# Patient Record
Sex: MALE | Race: WHITE | NOT HISPANIC OR LATINO
[De-identification: names, ages, dates, MRNs, and addresses within clinical notes are randomized per-mention and may not be internally consistent; named-entity substitution may affect disease eponyms.]

---

## 2017-03-18 ENCOUNTER — APPOINTMENT (OUTPATIENT)
Dept: ORTHOPEDIC SURGERY | Facility: CLINIC | Age: 62
End: 2017-03-18

## 2017-03-18 VITALS — WEIGHT: 315 LBS | TEMPERATURE: 97.9 F | HEIGHT: 70 IN | BODY MASS INDEX: 45.1 KG/M2

## 2017-03-18 DIAGNOSIS — M47.816 SPONDYLOSIS W/OUT MYELOPATHY OR RADICULOPATHY, LUMBAR REGION: ICD-10-CM

## 2017-03-18 DIAGNOSIS — Z78.9 OTHER SPECIFIED HEALTH STATUS: ICD-10-CM

## 2017-03-18 DIAGNOSIS — Z86.79 PERSONAL HISTORY OF OTHER DISEASES OF THE CIRCULATORY SYSTEM: ICD-10-CM

## 2017-03-18 DIAGNOSIS — Z82.61 FAMILY HISTORY OF ARTHRITIS: ICD-10-CM

## 2017-03-18 DIAGNOSIS — E78.00 PURE HYPERCHOLESTEROLEMIA, UNSPECIFIED: ICD-10-CM

## 2017-03-18 DIAGNOSIS — Z87.891 PERSONAL HISTORY OF NICOTINE DEPENDENCE: ICD-10-CM

## 2017-03-18 DIAGNOSIS — M48.06 SPINAL STENOSIS, LUMBAR REGION: ICD-10-CM

## 2017-03-18 DIAGNOSIS — Z87.39 PERSONAL HISTORY OF OTHER DISEASES OF THE MUSCULOSKELETAL SYSTEM AND CONNECTIVE TISSUE: ICD-10-CM

## 2017-03-18 RX ORDER — FOLIC ACID 1 MG/1
1 TABLET ORAL
Qty: 90 | Refills: 0 | Status: ACTIVE | COMMUNITY
Start: 2016-12-01

## 2017-03-18 RX ORDER — FENOFIBRATE 134 MG/1
134 CAPSULE ORAL
Qty: 90 | Refills: 0 | Status: ACTIVE | COMMUNITY
Start: 2017-03-07

## 2017-03-18 RX ORDER — CEFADROXIL 500 MG/1
500 CAPSULE ORAL
Qty: 14 | Refills: 0 | Status: ACTIVE | COMMUNITY
Start: 2016-12-06

## 2017-03-18 RX ORDER — CLOPIDOGREL BISULFATE 75 MG/1
75 TABLET, FILM COATED ORAL
Qty: 90 | Refills: 0 | Status: ACTIVE | COMMUNITY
Start: 2015-11-05

## 2017-03-18 RX ORDER — HYDROCHLOROTHIAZIDE 12.5 MG/1
12.5 CAPSULE ORAL
Qty: 45 | Refills: 0 | Status: ACTIVE | COMMUNITY
Start: 2017-03-02

## 2017-03-18 RX ORDER — MAGNESIUM OXIDE 400 MG
400 (241.3 MG) TABLET ORAL
Qty: 60 | Refills: 0 | Status: ACTIVE | COMMUNITY
Start: 2017-03-03

## 2017-03-18 RX ORDER — MOMETASONE 50 UG/1
50 SPRAY, METERED NASAL
Qty: 17 | Refills: 0 | Status: ACTIVE | COMMUNITY
Start: 2017-01-05

## 2017-03-18 RX ORDER — PANTOPRAZOLE 40 MG/1
40 TABLET, DELAYED RELEASE ORAL
Qty: 90 | Refills: 0 | Status: ACTIVE | COMMUNITY
Start: 2017-01-07

## 2017-03-18 RX ORDER — ATENOLOL 50 MG/1
50 TABLET ORAL
Qty: 90 | Refills: 0 | Status: ACTIVE | COMMUNITY
Start: 2016-09-21

## 2017-03-18 RX ORDER — CEFUROXIME AXETIL 250 MG/1
250 TABLET ORAL
Qty: 20 | Refills: 0 | Status: ACTIVE | COMMUNITY
Start: 2017-01-05

## 2017-03-18 RX ORDER — ROSUVASTATIN CALCIUM 40 MG/1
40 TABLET, FILM COATED ORAL
Qty: 90 | Refills: 0 | Status: ACTIVE | COMMUNITY
Start: 2017-03-02

## 2017-03-18 RX ORDER — NAPROXEN 500 MG/1
500 TABLET ORAL
Qty: 60 | Refills: 0 | Status: ACTIVE | COMMUNITY
Start: 2016-07-21

## 2017-03-18 RX ORDER — SILDENAFIL CITRATE 50 MG/1
50 TABLET, FILM COATED ORAL
Qty: 5 | Refills: 0 | Status: ACTIVE | COMMUNITY
Start: 2017-01-13

## 2017-03-18 RX ORDER — AMLODIPINE BESYLATE AND BENAZEPRIL HYDROCHLORIDE 5; 20 MG/1; MG/1
5-20 CAPSULE ORAL
Qty: 90 | Refills: 0 | Status: ACTIVE | COMMUNITY
Start: 2017-01-06

## 2017-03-24 ENCOUNTER — OUTPATIENT (OUTPATIENT)
Dept: OUTPATIENT SERVICES | Facility: HOSPITAL | Age: 62
LOS: 1 days | End: 2017-03-24
Payer: COMMERCIAL

## 2017-03-24 DIAGNOSIS — Z51.89 ENCOUNTER FOR OTHER SPECIFIED AFTERCARE: ICD-10-CM

## 2017-03-24 DIAGNOSIS — M48.06 SPINAL STENOSIS, LUMBAR REGION: ICD-10-CM

## 2017-04-13 PROCEDURE — 97163 PT EVAL HIGH COMPLEX 45 MIN: CPT

## 2017-04-13 PROCEDURE — 97110 THERAPEUTIC EXERCISES: CPT

## 2017-04-13 PROCEDURE — 97010 HOT OR COLD PACKS THERAPY: CPT

## 2017-05-17 RX ORDER — TRAMADOL HYDROCHLORIDE 50 MG/1
50 TABLET, COATED ORAL
Qty: 60 | Refills: 0 | Status: ACTIVE | COMMUNITY
Start: 2017-03-18 | End: 1900-01-01

## 2017-10-11 ENCOUNTER — EMERGENCY (EMERGENCY)
Facility: HOSPITAL | Age: 62
LOS: 1 days | Discharge: HOME | End: 2017-10-11
Attending: EMERGENCY MEDICINE
Payer: MEDICARE

## 2017-10-11 VITALS
DIASTOLIC BLOOD PRESSURE: 74 MMHG | WEIGHT: 257.94 LBS | SYSTOLIC BLOOD PRESSURE: 129 MMHG | HEIGHT: 70 IN | OXYGEN SATURATION: 97 % | HEART RATE: 63 BPM | TEMPERATURE: 98 F | RESPIRATION RATE: 18 BRPM

## 2017-10-11 PROCEDURE — 99283 EMERGENCY DEPT VISIT LOW MDM: CPT

## 2017-10-11 PROCEDURE — 73630 X-RAY EXAM OF FOOT: CPT | Mod: 26,RT

## 2017-10-11 PROCEDURE — 73630 X-RAY EXAM OF FOOT: CPT

## 2017-10-11 PROCEDURE — 99283 EMERGENCY DEPT VISIT LOW MDM: CPT | Mod: 25

## 2017-10-11 RX ORDER — OXYCODONE AND ACETAMINOPHEN 5; 325 MG/1; MG/1
1 TABLET ORAL ONCE
Qty: 0 | Refills: 0 | Status: DISCONTINUED | OUTPATIENT
Start: 2017-10-11 | End: 2017-10-11

## 2017-10-11 RX ADMIN — OXYCODONE AND ACETAMINOPHEN 1 TABLET(S): 5; 325 TABLET ORAL at 11:26

## 2017-10-11 NOTE — ED ADULT TRIAGE NOTE - CHIEF COMPLAINT QUOTE
pt states he tripped yesterday and injured his right foot. today right foot is red, swollen and painful

## 2017-10-11 NOTE — ED ADULT NURSE NOTE - PSH
S/P coronary artery bypass graft x 4  2013 at [facility]  S/P femoral-popliteal bypass surgery

## 2017-10-11 NOTE — ED PROVIDER NOTE - PSH
S/P coronary artery bypass graft x 4  2013 at [facility]  S/P femoral-popliteal bypass surgery

## 2017-10-11 NOTE — ED PROVIDER NOTE - ATTENDING CONTRIBUTION TO CARE
Tripped twice yesterday: once walking up stiars and second time obver a pile of rubbish.  Reports pain and swelling of right foot since; worse this morning with difficulty bearing wt.  denies injury to ankle, knee or hip.  Has not taken any rx.  PE:  nontoxic appearing, NWAD, STS of right foot with warmth and erythema to lateral aspect of right foot over 4-5th MTs.  no ankle swelling or ecchymsois, no malleolus tendenress,  FROM knee.

## 2017-10-11 NOTE — ED PROVIDER NOTE - PHYSICAL EXAMINATION
Right foot: + tenderness to proximal 5th metatarsal and midfoot with mild swelling and mild overlying erythema, NVI.

## 2017-10-11 NOTE — ED PROVIDER NOTE - PMH
AAA (abdominal aortic aneurysm) without rupture    Bacteremia    BPH (benign prostatic hyperplasia)    Cellulitis  LLE  COPD (chronic obstructive pulmonary disease)    Cutaneous T-cell lymphoma    DM (diabetes mellitus)    GERD (gastroesophageal reflux disease)    HTN (hypertension)    Stented coronary artery  x 4  Thalassemia trait

## 2017-10-24 ENCOUNTER — APPOINTMENT (OUTPATIENT)
Dept: INTERNAL MEDICINE | Facility: CLINIC | Age: 62
End: 2017-10-24

## 2018-01-21 ENCOUNTER — TRANSCRIPTION ENCOUNTER (OUTPATIENT)
Age: 63
End: 2018-01-21

## 2019-03-24 ENCOUNTER — TRANSCRIPTION ENCOUNTER (OUTPATIENT)
Age: 64
End: 2019-03-24

## 2020-05-13 ENCOUNTER — TRANSCRIPTION ENCOUNTER (OUTPATIENT)
Age: 65
End: 2020-05-13

## 2023-02-08 NOTE — ED ADULT NURSE NOTE - PAIN: PRESENCE, MLM
SCRIBE #1 NOTE: I, Jayshree Bedoya, am scribing for, and in the presence of, Claudio Guillen Jr., MD. I have scribed the entire note.       History     Chief Complaint   Patient presents with    Animal Bite     Pt broke up a dog fight this morning and has 3 bite wounds to R. Wrist. Pt states dogs are owned by him and are vaccinated      Review of patient's allergies indicates:  No Known Allergies      History of Present Illness     HPI    2/8/2023, 8:53 AM  History obtained from the patient      History of Present Illness: Shivam Jane is a 34 y.o. male patient who presents to the Emergency Department for evaluation of an animal bite which occurred this morning. Patient states he broke up a dog fight and has 3 bite wounds to his R hand. The dogs are rescues and have both been vaccinated. Symptoms are constant and moderate in severity. No mitigating or exacerbating factors reported. Associated sxs include wounds to R hand. Patient denies any fever, chills, N/V/D, CP, SOB, lightheadedness, numbness, HA, and all other sxs at this time. No prior Tx reported. No further complaints or concerns at this time.       Arrival mode: Personal vehicle    PCP: Emmanuel Jensen MD        Past Medical History:  No past medical history on file.    Past Surgical History:  No past surgical history on file.      Family History:  No family history on file.    Social History:  Social History     Tobacco Use    Smoking status: Not on file    Smokeless tobacco: Not on file   Substance and Sexual Activity    Alcohol use: Not on file    Drug use: Not on file    Sexual activity: Not on file        Review of Systems     Review of Systems   Constitutional:  Negative for chills and fever.   HENT:  Negative for sore throat.    Respiratory:  Negative for shortness of breath.    Cardiovascular:  Negative for chest pain.   Gastrointestinal:  Negative for diarrhea, nausea and vomiting.   Genitourinary:  Negative for dysuria.   Musculoskeletal:  Negative  for back pain.   Skin:  Positive for wound (R hand). Negative for rash.   Neurological:  Negative for weakness, light-headedness, numbness and headaches.   Hematological:  Does not bruise/bleed easily.   All other systems reviewed and are negative.     Physical Exam     Initial Vitals   BP Pulse Resp Temp SpO2   02/08/23 0825 02/08/23 0825 02/08/23 0825 02/08/23 1038 02/08/23 0825   (!) 76/36 94 20 98.2 °F (36.8 °C) 100 %      MAP       --                 Physical Exam  Nursing Notes and Vital Signs Reviewed.  Constitutional: Patient is in no acute distress. Well-developed and well-nourished.  Head: Atraumatic. Normocephalic.  Eyes: PERRL. EOM intact. Conjunctivae are not pale. No scleral icterus.  ENT: Mucous membranes are moist.   Neck: Supple. Full ROM.   Cardiovascular: Regular rate. Regular rhythm. No murmurs, rubs, or gallops. Distal pulses are 2+ and symmetric.  Pulmonary/Chest: No respiratory distress. Clear to auscultation bilaterally. No wheezing or rales.  Abdominal: Soft and non-distended.  There is no tenderness.  No rebound, guarding, or rigidity.   Musculoskeletal: Moves all extremities. No obvious deformities. No edema.   Skin: Warm and dry.  Neurological:  Alert, awake, and appropriate.  Normal speech.  No acute focal neurological deficits are appreciated.  Psychiatric: Normal affect. Good eye contact. Appropriate in content.     ED Course   Lac Repair    Date/Time: 2/8/2023 10:08 AM  Performed by: Francisco Taveras NP  Authorized by: Claudio Guillen Jr., MD     Consent:     Consent obtained:  Verbal    Consent given by:  Patient    Risks discussed:  Infection and pain  Universal protocol:     Patient identity confirmed:  Verbally with patient  Anesthesia:     Anesthesia method:  Local infiltration    Local anesthetic:  Lidocaine 1% w/o epi  Laceration details:     Location:  Hand    Hand location:  R wrist    Length (cm):  2.5  Pre-procedure details:     Preparation:  Patient was prepped and draped in  "usual sterile fashion and imaging obtained to evaluate for foreign bodies  Exploration:     Imaging obtained: x-ray      Imaging outcome: foreign body not noted      Wound exploration: wound explored through full range of motion and entire depth of wound visualized    Treatment:     Area cleansed with:  Povidone-iodine    Amount of cleaning:  Extensive    Irrigation solution:  Sterile saline    Irrigation method:  Pressure wash and syringe  Skin repair:     Repair method:  Sutures    Suture size:  4-0    Suture material:  Prolene    Suture technique:  Simple interrupted    Number of sutures:  3  Approximation:     Approximation:  Loose  Repair type:     Repair type:  Simple  Post-procedure details:     Dressing:  Sterile dressing    Procedure completion:  Tolerated  ED Vital Signs:  Vitals:    02/08/23 0825 02/08/23 0848 02/08/23 0900 02/08/23 1038   BP: (!) 76/36  110/69 108/65   Pulse: 94  77 82   Resp: 20 18 18 15   Temp:    98.2 °F (36.8 °C)   TempSrc: Oral   Oral   SpO2: 100%  98% 100%   Weight: 81.1 kg (178 lb 14.5 oz)      Height:        02/08/23 1053   BP:    Pulse:    Resp:    Temp:    TempSrc:    SpO2:    Weight:    Height: 5' 6" (1.676 m)       Abnormal Lab Results:  Labs Reviewed   CBC W/ AUTO DIFFERENTIAL - Abnormal; Notable for the following components:       Result Value    MPV 9.0 (*)     All other components within normal limits   COMPREHENSIVE METABOLIC PANEL - Abnormal; Notable for the following components:    Glucose 128 (*)     Alkaline Phosphatase 52 (*)     All other components within normal limits   TYPE & SCREEN        All Lab Results:  Results for orders placed or performed during the hospital encounter of 02/08/23   CBC auto differential   Result Value Ref Range    WBC 4.21 3.90 - 12.70 K/uL    RBC 5.38 4.60 - 6.20 M/uL    Hemoglobin 15.5 14.0 - 18.0 g/dL    Hematocrit 45.3 40.0 - 54.0 %    MCV 84 82 - 98 fL    MCH 28.8 27.0 - 31.0 pg    MCHC 34.2 32.0 - 36.0 g/dL    RDW 13.1 11.5 - 14.5 % "    Platelets 224 150 - 450 K/uL    MPV 9.0 (L) 9.2 - 12.9 fL    Immature Granulocytes 0.2 0.0 - 0.5 %    Gran # (ANC) 1.8 1.8 - 7.7 K/uL    Immature Grans (Abs) 0.01 0.00 - 0.04 K/uL    Lymph # 1.8 1.0 - 4.8 K/uL    Mono # 0.3 0.3 - 1.0 K/uL    Eos # 0.3 0.0 - 0.5 K/uL    Baso # 0.05 0.00 - 0.20 K/uL    nRBC 0 0 /100 WBC    Gran % 42.0 38.0 - 73.0 %    Lymph % 41.8 18.0 - 48.0 %    Mono % 8.1 4.0 - 15.0 %    Eosinophil % 6.7 0.0 - 8.0 %    Basophil % 1.2 0.0 - 1.9 %    Differential Method Automated    Comprehensive metabolic panel   Result Value Ref Range    Sodium 139 136 - 145 mmol/L    Potassium 4.5 3.5 - 5.1 mmol/L    Chloride 107 95 - 110 mmol/L    CO2 24 23 - 29 mmol/L    Glucose 128 (H) 70 - 110 mg/dL    BUN 13 6 - 20 mg/dL    Creatinine 1.1 0.5 - 1.4 mg/dL    Calcium 9.1 8.7 - 10.5 mg/dL    Total Protein 6.9 6.0 - 8.4 g/dL    Albumin 4.0 3.5 - 5.2 g/dL    Total Bilirubin 0.9 0.1 - 1.0 mg/dL    Alkaline Phosphatase 52 (L) 55 - 135 U/L    AST 20 10 - 40 U/L    ALT 34 10 - 44 U/L    Anion Gap 8 8 - 16 mmol/L    eGFR >60 >60 mL/min/1.73 m^2   Type & Screen   Result Value Ref Range    Group & Rh O POS     Indirect Yessenia NEG        Imaging Results:  Imaging Results              X-Ray Wrist Complete Right (Final result)  Result time 02/08/23 09:51:07      Final result by TERA Corcoran Sr., MD (02/08/23 09:51:07)                   Impression:      1. There is prominence of the soft tissue around the wrist.  This is consistent with the patient's history and characteristic of a soft tissue contusion.  2. There is no radiopaque foreign body visualized.  3. No fracture or dislocation      Electronically signed by: Chadd Corcoran MD  Date:    02/08/2023  Time:    09:51               Narrative:    EXAMINATION:  XR WRIST COMPLETE 3 VIEWS RIGHT; XR HAND COMPLETE 3 VIEW RIGHT    CLINICAL HISTORY:  Bitten by dog, initial encounterDog bite;    COMPARISON:  None    FINDINGS:  There is no fracture. There is no dislocation.   There is prominence of the soft tissue around the wrist.  There is no radiopaque foreign body visualized.                                       X-Ray Hand 3 View Right (Final result)  Result time 02/08/23 09:51:07      Final result by TERA Corcoran Sr., MD (02/08/23 09:51:07)                   Impression:      1. There is prominence of the soft tissue around the wrist.  This is consistent with the patient's history and characteristic of a soft tissue contusion.  2. There is no radiopaque foreign body visualized.  3. No fracture or dislocation      Electronically signed by: Chadd Corcoran MD  Date:    02/08/2023  Time:    09:51               Narrative:    EXAMINATION:  XR WRIST COMPLETE 3 VIEWS RIGHT; XR HAND COMPLETE 3 VIEW RIGHT    CLINICAL HISTORY:  Bitten by dog, initial encounterDog bite;    COMPARISON:  None    FINDINGS:  There is no fracture. There is no dislocation.  There is prominence of the soft tissue around the wrist.  There is no radiopaque foreign body visualized.                                              The Emergency Provider reviewed the vital signs and test results, which are outlined above.     ED Discussion       11:08 AM: Reassessed pt at this time. Discussed with pt all pertinent ED information and results. Discussed pt dx and plan of tx. Gave pt all f/u and return to the ED instructions. All questions and concerns were addressed at this time. Pt expresses understanding of information and instructions, and is comfortable with plan to discharge. Pt is stable for discharge.    I discussed with patient and/or family/caretaker that evaluation in the ED does not suggest any emergent or life threatening medical conditions requiring immediate intervention beyond what was provided in the ED, and I believe patient is safe for discharge.  Regardless, an unremarkable evaluation in the ED does not preclude the development or presence of a serious of life threatening condition. As such, patient was  instructed to return immediately for any worsening or change in current symptoms.       Medical Decision Making:   Initial Assessment:   34-year-old male presents after a dog bite to his right wrist area to the base of his right thumb he is got a 3 cm laceration he is neurovascularly intact to his hand.  Patient has multiple puncture wounds on the ulnar side of his wrist.  The wounds were thoroughly irrigated he was updated with his tetanus was given Ancef nurse practitioner loosely approximated the skin to the large lack.  He was put on Augmentin discharged in stable condition.  It appears he did have a vasovagal episode at triage his blood pressure dropped and he was hypotensive she was taken straight to the back in the ER.  Clinical Tests:   Lab Tests: Ordered and Reviewed  Radiological Study: Ordered and Reviewed         ED Medication(s):  Medications   sodium chloride 0.9% bolus 1,000 mL 1,000 mL (0 mLs Intravenous Stopped 2/8/23 1000)   morphine injection 4 mg (4 mg Intravenous Given 2/8/23 0848)   ondansetron injection 4 mg (4 mg Intravenous Given 2/8/23 0848)   Tdap (BOOSTRIX) vaccine injection 0.5 mL (0.5 mLs Intramuscular Given 2/8/23 0903)   ceFAZolin (ANCEF) 1 gram in dextrose 5 % 50 mL IVPB (premix) (0 g Intravenous Stopped 2/8/23 1000)   LIDOcaine 2%/EPINEPHrine 1:100,000 injection 10 mL (10 mLs Intradermal Given by Provider 2/8/23 1000)   bacitracin ointment 1 Tube (1 Tube Topical (Top) Given 2/8/23 1038)       New Prescriptions    AMOXICILLIN-CLAVULANATE 875-125MG (AUGMENTIN) 875-125 MG PER TABLET    Take 1 tablet by mouth 2 (two) times daily. for 7 days        Follow-up Information       Emmanuel Jensen MD.    Specialty: Family Medicine  Contact information:  [address]  [place] [address] [phone]               [facility] - Emergency Dept..    Specialty: Emergency Medicine  Why: If symptoms worsen  Contact information:  [address]  [facility]  [address]  [phone]                               Scribe Attestation:   Scribe #1: I performed the above scribed service and the documentation accurately describes the services I performed. I attest to the accuracy of the note.     Attending:   Physician Attestation Statement for Scribe #1: I, Claudio Guillen Jr., MD, personally performed the services described in this documentation, as scribed by Jayshree Bedoya, in my presence, and it is both accurate and complete.           Clinical Impression       ICD-10-CM ICD-9-CM   1. Dog bite, initial encounter  W54.0XXA 879.8     E906.0   2. Dog bite  W54.0XXA 879.8     E906.0   3. Wrist laceration, right, initial encounter  S61.511A 881.02       Disposition:   Disposition: Discharged  Condition: Stable       Claudio Guillen Jr., MD  02/08/23 1511     complains of pain/discomfort
